# Patient Record
Sex: FEMALE | Race: WHITE | Employment: OTHER | URBAN - METROPOLITAN AREA
[De-identification: names, ages, dates, MRNs, and addresses within clinical notes are randomized per-mention and may not be internally consistent; named-entity substitution may affect disease eponyms.]

---

## 2017-09-06 ENCOUNTER — THERAPY VISIT (OUTPATIENT)
Dept: PHYSICAL THERAPY | Facility: CLINIC | Age: 77
End: 2017-09-06
Payer: MEDICARE

## 2017-09-06 DIAGNOSIS — M72.2 BILATERAL PLANTAR FASCIITIS: Primary | ICD-10-CM

## 2017-09-06 PROCEDURE — G8978 MOBILITY CURRENT STATUS: HCPCS | Mod: GP | Performed by: PHYSICAL THERAPIST

## 2017-09-06 PROCEDURE — G8979 MOBILITY GOAL STATUS: HCPCS | Mod: GP | Performed by: PHYSICAL THERAPIST

## 2017-09-06 PROCEDURE — 97110 THERAPEUTIC EXERCISES: CPT | Mod: GP | Performed by: PHYSICAL THERAPIST

## 2017-09-06 PROCEDURE — 97161 PT EVAL LOW COMPLEX 20 MIN: CPT | Mod: GP | Performed by: PHYSICAL THERAPIST

## 2017-09-06 NOTE — MR AVS SNAPSHOT
"              After Visit Summary   9/6/2017    Yessica Nick    MRN: [id number]           Patient Information     Date Of Birth          1940        Visit Information        Provider Department      9/6/2017 7:40 AM Pamela Mccormick, PT [facility]        Today's Diagnoses     Bilateral plantar fasciitis    -  1       Follow-ups after your visit        Your next 10 appointments already scheduled     Sep 13, 2017  1:30 PM CDT   KAY Extremity with Pamela Mccormick PT   [facility] [place] [facility] ([facility] [place]  )    [address] [id number] [phone]              Who to contact     If you have questions or need follow up information about today's clinic visit or your schedule please contact [facility] PHYSICAL THERAPY directly at [phone].  Normal or non-critical lab and imaging results will be communicated to you by MyChart, letter or phone within 4 business days after the clinic has received the results. If you do not hear from us within 7 days, please contact the clinic through [employer] or phone. If you have a critical or abnormal lab result, we will notify you by phone as soon as possible.  Submit refill requests through [employer] or call your pharmacy and they will forward the refill request to us. Please allow 3 business days for your refill to be completed.          Additional Information About Your Visit        MyChart Information     [employer] lets you send messages to your doctor, view your test results, renew your prescriptions, schedule appointments and more. To sign up, go to [email or web address] . Click on \"Log in\" on the left side of the screen, which will take you to the Welcome page. Then click on \"Sign up Now\" on the right side of the page.     You will be asked to enter the access code listed below, as well as some personal " information. Please follow the directions to create your username and password.     Your access code is: [email or web address]  Expires: 2017  1:11 PM     Your access code will  in 90 days. If you need help or a new code, please call your [place] clinic or [phone].        Care EveryWhere ID     This is your Care EveryWhere ID. This could be used by other organizations to access your [place] medical records  [id number]         Blood Pressure from Last 3 Encounters:   No data found for BP    Weight from Last 3 Encounters:   No data found for Wt              We Performed the Following     HC PT EVAL, LOW COMPLEXITY     KAY CERT REPORT     KAY INITIAL EVAL REPORT     THERAPEUTIC EXERCISES        Primary Care Provider    None Specified       No primary provider on file.        Equal Access to Services     MARIE ORNELAS : Hadii merry Li, waaxda kristaadaha, qaybta kaalmada adecirayanoel, katty zelaya . So [facility] [phone].    ATENCIÓN: Si habla español, tiene a rodríguez disposición servicios gratuitos de asistencia lingüística. Llame al [phone].    We comply with applicable federal civil rights laws and [place] laws. We do not discriminate on the basis of race, color, national origin, age, disability sex, sexual orientation or gender identity.            Thank you!     Thank you for choosing [facility] PHYSICAL THERAPY  for your care. Our goal is always to provide you with excellent care. Hearing back from our patients is one way we can continue to improve our services. Please take a few minutes to complete the written survey that you may receive in the mail after your visit with us. Thank you!             Your Updated Medication List - Protect others around you: Learn how to safely use, store and throw away your medicines at www.disposemymeds.org.      Notice  As of 2017 11:59 PM    You have not been prescribed any medications.

## 2017-09-06 NOTE — PROGRESS NOTES
"[place] [facility] Initial Evaluation    Subjective:    Patient is a 76 year old female presenting with rehab left ankle/foot hpi.   Yessica Nick is a 76 year old female with a bilateral feet condition.        Patient had insidious onset pain bilateral plantar heel and arch, left>right about 2 weeks ago, MD orders for PT 8-22-17.  Pain is ranging 0-6/10, describes as \"achy/sharp\".  Symptoms increase with first steps in the morning or after sitting, walking >10'.  Symptoms decrease with stretching her great toe into extension and using ice.  Patient had similar issue on right side in 2012, resolved with PT.  Patient volunteers 3 hours (1 day/week) - on her feet. .             Pain is the same all the time.     Since onset symptoms are gradually worsening.        General health as reported by patient is good.  Pertinent medical history includes:  High blood pressure and anemia.  Medical allergies: no.  Other surgeries include:  None reported.  Current medications:  High blood pressure medication.  Current occupation is Retired - office.        Barriers include:  None as reported by patient.    Red flags:  None as reported by patient.                        Objective:    Standing Alignment:                Ankle/foot deviations: arch height normal in WB, high NWB; no hallux valgus, no bunions.    Gait:  Shortened step length, early toe-off bilaterally     Gait Type:  Antalgic   Assistive Devices:  None            Ankle/Foot Evaluation  ROM:    AROM:    Dorsiflexion:  Left:   0  Right:   1  Plantarflexion:  Left:  60    Right:  54  Inversion:  Left:  WNL     Right:  WNL  Eversion:  WNL     Right:  WNL      PROM:              Great Toe Extension:  Left:    WNL     Right: WNL        LIGAMENT TESTING: normal                PALPATION:   Left ankle tenderness present at:  plantar fascia and medial calcaneal  Left ankle tenderness not present at:   achilles tendon; anterior tibialis; posterior tibialis or " anterior talofibular ligament  Right ankle tenderness present at:   plantar fascia and medial calcaneal  Right ankle tenderness not present at:  achilles tendon; anterior tibialis; posterior tibialis or anterior talofibular ligament  EDEMA: normal                                                            MMT bilateral hips, knees, ankles WNL  General     ROS    Assessment/Plan:      Patient is a 76 year old female with both sides ankle complaints.    Patient has the following significant findings with corresponding treatment plan.                Diagnosis 1:  Bilateral plantar fascitis    Pain -  hot/cold therapy, self management, education and home program  Decreased ROM/flexibility - manual therapy, therapeutic exercise and home program  Inflammation - cold therapy and self management/home program  Impaired gait - home program  Decreased function - therapeutic activities and home program    Therapy Evaluation Codes:   1) History comprised of:   Personal factors that impact the plan of care:      Past/current experiences.    Comorbidity factors that impact the plan of care are:      None.     Medications impacting care: None.  2) Examination of Body Systems comprised of:   Body structures and functions that impact the plan of care:      Ankle.   Activity limitations that impact the plan of care are:      Walking.  3) Clinical presentation characteristics are:   Stable/Uncomplicated.  4) Decision-Making    Low complexity using standardized patient assessment instrument and/or measureable assessment of functional outcome.  Cumulative Therapy Evaluation is: Low complexity.    Previous and current functional limitations:  (See Goal Flow Sheet for this information)    Short term and Long term goals: (See Goal Flow Sheet for this information)     Communication ability:  Patient appears to be able to clearly communicate and understand verbal and written communication and follow directions correctly.  Treatment  Explanation - The following has been discussed with the patient:   RX ordered/plan of care  Anticipated outcomes  Possible risks and side effects  This patient would benefit from PT intervention to resume normal activities.   Rehab potential is good.    Frequency:  1 X week, once daily  Duration:  for 6 weeks  Discharge Plan:  Achieve all LTG.  Independent in home treatment program.  Reach maximal therapeutic benefit.    Please refer to the daily flowsheet for treatment today, total treatment time and time spent performing 1:1 timed codes.

## 2017-09-06 NOTE — LETTER
"DEPARTMENT OF HEALTH AND HUMAN SERVICES  CENTERS FOR MEDICARE & MEDICAID SERVICES    PLAN/UPDATED PLAN OF PROGRESS FOR OUTPATIENT REHABILITATION    PATIENTS NAME:  Yessica Nick   : 1940  PROVIDER NUMBER:    [id number]  [id number]  PROVIDER NAME: [place] [facility] PH[facility]ICAL [facility]  MEDICAL RECORD NUMBER: [id number]   START OF CARE DATE:  SOC Date: 17   TYPE:  PT  PRIMARY/TREATMENT DIAGNOSIS: (Pertinent Medical Diagnosis)  Bilateral plantar fasciitis    VISITS FROM START OF CARE:  Rxs Used: 1     [place] [facility] Initial Evaluation    Subjective:  Yessica Nick is a 76 year old female with a bilateral feet condition.  Patient had insidious onset pain bilateral plantar heel and arch, left>right about 2 weeks ago.  Pain is ranging 0-6/10, describes as \"achy/sharp\".  Symptoms increase with first steps in the morning or after sitting, walking >10'.  Symptoms decrease with stretching her great toe into extension and using ice.  Patient had similar issue on right side in , resolved with PT.  Patient volunteers 3 hours (1 day/week) - on her feet.  Pain is the same all the time.  Since onset symptoms are gradually worsening.   General health as reported by patient is good.  Pertinent medical history includes:  High blood pressure and anemia.  Medical allergies: no.  Other surgeries include:  None reported.  Current medications:  High blood pressure medication.  Current occupation is Retired - office.   Barriers include:  None as reported by patient.  Red flags:  None as reported by patient.    Objective:  Standing Alignment:    Ankle/foot deviations: arch height normal in WB, high NWB; no hallux valgus, no bunions.  Gait:  Shortened step length, early toe-off bilaterally   Gait Type:  Antalgic   Assistive Devices:  None  Ankle/Foot Evaluation  ROM:    AROM:    Dorsiflexion:  Left:   0  Right:   1  Plantarflexion:  Left:  60    Right:  54  Inversion:  " Left:  WNL     Right:  WNL  Eversion:  WNL     Right:  WNL  PROM:    Great Toe Extension:  Left:    WNL     Right: WNL  LIGAMENT TESTING: normal  PALPATION:   Left ankle tenderness present at:  plantar fascia and medial calcaneal  Left ankle tenderness not present at:   achilles tendon; anterior tibialis; posterior tibialis or anterior talofibular ligament  Right ankle tenderness present at:   plantar fascia and medial calcaneal  Right ankle tenderness not present at:  achilles tendon; anterior tibialis; posterior tibialis or anterior talofibular ligament  EDEMA: normal  MMT bilateral hips, knees, ankles WNL    ROS  Assessment/Plan:    Patient is a 76 year old female with both sides ankle complaints.    Patient has the following significant findings with corresponding treatment plan.                Diagnosis 1:  Bilateral plantar fascitis  Pain -  hot/cold therapy, self management, education and home program  Decreased ROM/flexibility - manual therapy, therapeutic exercise and home program  Inflammation - cold therapy and self management/home program  Impaired gait - home program  Decreased function - therapeutic activities and home program    Therapy Evaluation Codes:   1) History comprised of:   Personal factors that impact the plan of care:      Past/current experiences.    Comorbidity factors that impact the plan of care are:      None.     Medications impacting care: None.  2) Examination of Body Systems comprised of:   Body structures and functions that impact the plan of care:      Ankle.   Activity limitations that impact the plan of care are:      Walking.  3) Clinical presentation characteristics are:   Stable/Uncomplicated.  4) Decision-Making    Low complexity using standardized patient assessment instrument and/or   measureable assessment of functional outcome.  Cumulative Therapy Evaluation is: Low complexity.    Previous and current functional limitations:  (See Goal Flow Sheet for this information)   "  Short term and Long term goals: (See Goal Flow Sheet for this information)     Communication ability:  Patient appears to be able to clearly communicate and understand verbal and written communication and follow directions correctly.  Treatment Explanation - The following has been discussed with the patient:   RX ordered/plan of care  Anticipated outcomes  Possible risks and side effects  This patient would benefit from PT intervention to resume normal activities.   Rehab potential is good.    Frequency:  1 X week, once daily  Duration:  for 6 weeks  Discharge Plan:  Achieve all LTG.  Independent in home treatment program.  Reach maximal therapeutic benefit.            Caregiver Signature/Credentials _____________________________ Date ________       Treating Provider: Pamela Mccormick PT    I have reviewed and certified the need for these services and plan of treatment while under my care.        PHYSICIAN'S SIGNATURE:   __________________________________  Date___________         Pamela Goldberg MD    Certification period:  Beginning of Cert date period: 09/06/17 to  End of Cert period date: 11/05/17     Functional Level Progress Report: Please see attached \"Goal Flow sheet for Functional level.\"    ____X____ Continue Services or       ________ DC Services                Service dates: From  SOC Date: 09/06/17 date to present                         "

## 2017-09-06 NOTE — LETTER
"DEPARTMENT OF HEALTH AND HUMAN SERVICES  CENTERS FOR MEDICARE & MEDICAID SERVICES    PLAN/UPDATED PLAN OF PROGRESS FOR OUTPATIENT REHABILITATION    PATIENTS NAME:  Yessica Nick   : 1940  PR[facility]BER:    [id number]  [id number]  PROVIDER NAME: [place] [facility] PHYSICAL THERAPY  MEDICAL RECORD NUMBER: [id number]   START OF CARE DATE:  SOC Date: 17   TYPE:  PT  PRIMARY/TREATMENT DIAGNOSIS: (Pertinent Medical Diagnosis)  Bilateral plantar fasciitis    VISITS FROM START OF CARE:  Rxs Used: 1     [place] [facility] Initial Evaluation    Subjective:  Yessica Nick is a 76 year old female with a bilateral feet condition.   Patient had insidious onset pain bilateral plantar heel and arch, left>right about 2 weeks ago, MD orders for PT 17.  Pain is ranging 0-6/10, describes as \"achy/sharp\".  Symptoms increase with first steps in the morning or after sitting, walking >10'.  Symptoms decrease with stretching her great toe into extension and using ice.  Patient had similar issue on right side in , resolved with PT.  Patient volunteers 3 hours (1 day/week) - on her feet.  Pain is the same all the time.   Since onset symptoms are gradually worsening.  General health as reported by patient is good.  Pertinent medical history includes:  High blood pressure and anemia.  Medical allergies: no.  Other surgeries include:  None reported.  Current medications:  High blood pressure medication.  Current occupation is Retired - office.   Barriers include:  None as reported by patient.  Red flags:  None as reported by patient.    Objective:  Standing Alignment:    Ankle/foot deviations: arch height normal in WB, high NWB; no hallux valgus, no bunions.  Gait:  Shortened step length, early toe-off bilaterally   Gait Type:  Antalgic   Assistive Devices:  None  Ankle/Foot Evaluation  ROM:    AROM:    Dorsiflexion:  Left:   0  Right:   1  Plantarflexion:  Left:  60    " Right:  54  Inversion:  Left:  WNL     Right:  WNL  Eversion:  WNL     Right:  WNL  PROM:    Great Toe Extension:  Left:    WNL     Right: WNL  LIGAMENT TESTING: normal          PALPATION:   Left ankle tenderness present at:  plantar fascia and medial calcaneal  Left ankle tenderness not present at:   achilles tendon; anterior tibialis; posterior tibialis or anterior talofibular ligament  Right ankle tenderness present at:   plantar fascia and medial calcaneal  Right ankle tenderness not present at:  achilles tendon; anterior tibialis; posterior tibialis or anterior talofibular ligament  EDEMA: normal  MMT bilateral hips, knees, ankles WNL    ROS  Assessment/Plan:    Patient is a 76 year old female with both sides ankle complaints.    Patient has the following significant findings with corresponding treatment plan.                Diagnosis 1:  Bilateral plantar fascitis  Pain -  hot/cold therapy, self management, education and home program  Decreased ROM/flexibility - manual therapy, therapeutic exercise and home program  Inflammation - cold therapy and self management/home program  Impaired gait - home program  Decreased function - therapeutic activities and home program    Therapy Evaluation Codes:   1) History comprised of:   Personal factors that impact the plan of care:      Past/current experiences.    Comorbidity factors that impact the plan of care are:      None.     Medications impacting care: None.  2) Examination of Body Systems comprised of:   Body structures and functions that impact the plan of care:      Ankle.   Activity limitations that impact the plan of care are:      Walking.  3) Clinical presentation characteristics are:   Stable/Uncomplicated.  4) Decision-Making    Low complexity using standardized patient assessment instrument and/or   measureable assessment of functional outcome.  Cumulative Therapy Evaluation is: Low complexity.    Previous and current functional limitations:  (See Goal  "Flow Sheet for this information)    Short term and Long term goals: (See Goal Flow Sheet for this information)     Communication ability:  Patient appears to be able to clearly communicate and understand verbal and written communication and follow directions correctly.  Treatment Explanation - The following has been discussed with the patient:   RX ordered/plan of care  Anticipated outcomes  Possible risks and side effects  This patient would benefit from PT intervention to resume normal activities.   Rehab potential is good.      Frequency:  1 X week, once daily  Duration:  for 6 weeks  Discharge Plan:  Achieve all LTG.  Independent in home treatment program.  Reach maximal therapeutic benefit.    Caregiver Signature/Credentials _____________________________ Date ________       Treating Provider: Pamela Mccormick PT    I have reviewed and certified the need for these services and plan of treatment while under my care.        PHYSICIAN'S SIGNATURE:   ________________________________ Date___________       Pamela Goldberg MD    Certification period:  Beginning of Cert date period: 09/06/17 to  End of Cert period date: 11/05/17     Functional Level Progress Report: Please see attached \"Goal Flow sheet for Functional level.\"    ____X____ Continue Services or       ________ DC Services                Service dates: From  SOC Date: 09/06/17 date to present                         "

## 2017-09-13 ENCOUNTER — THERAPY VISIT (OUTPATIENT)
Dept: PHYSICAL THERAPY | Facility: CLINIC | Age: 77
End: 2017-09-13
Payer: MEDICARE

## 2017-09-13 DIAGNOSIS — M72.2 BILATERAL PLANTAR FASCIITIS: ICD-10-CM

## 2017-09-13 PROCEDURE — 97110 THERAPEUTIC EXERCISES: CPT | Mod: GP | Performed by: PHYSICAL THERAPIST

## 2017-09-20 ENCOUNTER — THERAPY VISIT (OUTPATIENT)
Dept: PHYSICAL THERAPY | Facility: CLINIC | Age: 77
End: 2017-09-20
Payer: MEDICARE

## 2017-09-20 DIAGNOSIS — M72.2 BILATERAL PLANTAR FASCIITIS: ICD-10-CM

## 2017-09-20 PROCEDURE — 97110 THERAPEUTIC EXERCISES: CPT | Mod: GP | Performed by: PHYSICAL THERAPIST

## 2017-09-20 PROCEDURE — 97140 MANUAL THERAPY 1/> REGIONS: CPT | Mod: GP | Performed by: PHYSICAL THERAPIST

## 2017-09-20 NOTE — MR AVS SNAPSHOT
"              After Visit Summary   9/20/2017    Yessica Nick    MRN: [id number]           Patient Information     Date Of Birth          1940        Visit Information        Provider Department      9/20/2017 10:20 AM Pamela Mccormick PT [facility] Physical Therapy        Today's Diagnoses     Bilateral plantar fasciitis           Follow-ups after your visit        Your next 10 appointments already scheduled     Sep 29, 2017  9:00 AM CDT   KAY Extremity with Pamela Mccormick PT   [facility]    [address] [id number]   [phone]              Who to contact     If you have questions or need follow up information about today's clinic visit or your schedule please contact [facility] ATHLETIC [facility] PHYSICAL THERAPY directly at [phone].  Normal or non-critical lab and imaging results will be communicated to you by MyChart, letter or phone within 4 business days after the clinic has received the results. If you do not hear from us within 7 days, please contact the clinic through MyChart or phone. If you have a critical or abnormal lab result, we will notify you by phone as soon as possible.  Submit refill requests through [employer] or call your pharmacy and they will forward the refill request to us. Please allow 3 business days for your refill to be completed.          Additional Information About Your Visit        MyChart Information     [employer] lets you send messages to your doctor, view your test results, renew your prescriptions, schedule appointments and more. To sign up, go to [email or web address] . Click on \"Log in\" on the left side of the screen, which will take you to the Welcome page. Then click on \"Sign up Now\" on the right side of the page.     You will be asked to enter the access code listed below, as well as some personal information. " Please follow the directions to create your username and password.     Your access code is: [email or web address]  Expires: 2017  1:11 PM     Your access code will  in 90 days. If you need help or a new code, please call your [place] clinic or [phone].        Care EveryWhere ID     This is your Care EveryWhere ID. This could be used by other organizations to access your [place] medical records  [id number]         Blood Pressure from Last 3 Encounters:   No data found for BP    Weight from Last 3 Encounters:   No data found for Wt              We Performed the Following     MANUAL THER TECH,1+REGIONS,EA 15 MIN     THERAPEUTIC EXERCISES        Primary Care Provider    None Specified       No primary provider on file.        Equal Access to Services     MARIE ORNELAS : Fariha Li, soraya douglass, hernando hall, katty zelaya . So [facility] [phone].    ATENCIÓN: Si habla español, tiene a rodríguez disposición servicios gratuitos de asistencia lingüística. Llame al [phone].    We comply with applicable federal civil rights laws and [place] laws. We do not discriminate on the basis of race, color, national origin, age, disability sex, sexual orientation or gender identity.            Thank you!     Thank you for choosing [facility] PHYSICAL THERAPY  for your care. Our goal is always to provide you with excellent care. Hearing back from our patients is one way we can continue to improve our services. Please take a few minutes to complete the written survey that you may receive in the mail after your visit with us. Thank you!             Your Updated Medication List - Protect others around you: Learn how to safely use, store and throw away your medicines at www.disposemymeds.org.      Notice  As of 2017 10:32 PM    You have not been prescribed any medications.

## 2019-01-15 PROBLEM — M72.2 BILATERAL PLANTAR FASCIITIS: Status: RESOLVED | Noted: 2017-09-06 | Resolved: 2019-01-15

## 2019-01-15 NOTE — PROGRESS NOTES
"Subjective:  HPI                    Objective:  System    Physical Exam    General     ROS    Assessment/Plan:    DISCHARGE REPORT    Progress reporting period is from 9-6-17 to 9-20-17, 3 visits.       SUBJECTIVE  Patient initially had c/o bilateral plantar heel and arch, left>right ranged 0-6/10, described as \"achy/sharp\".  Symptoms increased with first steps in the morning or after sitting, walking >10'.  Symptoms decreased with stretching her great toe into extension and using ice.    At last visit patient reported pain was improving left, symptoms nil right. Pain with first steps in a.m. decreased.    Final status unknown as patient did not return for further visits.   Pain level at last visit: 0/10(at rest ).     Initial Pain level: (0-6/10).   Changes in function:  Yes (See Goal flowsheet attached for changes in current functional level)  Adverse reaction to treatment or activity: None    OBJECTIVE  AROM right ankle DF 4 degrees, left 3 degrees past neutral, slight antalgic gait.       ASSESSMENT/PLAN  Updated problem list and treatment plan: Diagnosis 1:  Bilateral plantar fascitis    Assessment of Progress: The patient has not returned to therapy. Current status is unknown.  Self Management Plans:  Patient has been instructed in a home treatment program.      Recommendations:  Discontinue PT as did not return for further visits.    Please refer to the daily flowsheet for treatment today, total treatment time and time spent performing 1:1 timed codes.            "

## 2019-07-15 ENCOUNTER — THERAPY VISIT (OUTPATIENT)
Dept: PHYSICAL THERAPY | Facility: CLINIC | Age: 79
End: 2019-07-15
Payer: MEDICARE

## 2019-07-15 DIAGNOSIS — M79.18 RIGHT BUTTOCK PAIN: ICD-10-CM

## 2019-07-15 PROCEDURE — 97110 THERAPEUTIC EXERCISES: CPT | Mod: GP | Performed by: PHYSICAL THERAPIST

## 2019-07-15 PROCEDURE — 97161 PT EVAL LOW COMPLEX 20 MIN: CPT | Mod: GP | Performed by: PHYSICAL THERAPIST

## 2019-07-15 NOTE — PROGRESS NOTES
[facility] [facility] Initial Evaluation  Subjective:      and reported as 2/10 on pain scale. General health as reported by patient is good. Pertinent medical history includes:  Anemia, concussions/dizziness, high blood pressure and osteoporosis.      Current medications:  Bone density and high blood pressure medication.   Primary job tasks include:  Driving, lifting/carrying and pushing/pulling.           Patient is Retired.         Oswestry Score: 22.22 %                 Objective:  System    Physical Exam    General     ROS    Assessment/Plan:

## 2019-07-15 NOTE — LETTER
DEPARTMENT OF HEALTH AND HUMAN SERVICES  CENTERS FOR MEDICARE & MEDICAID SERVICES    PLAN/UPDATED PLAN OF PROGRESS FOR OUTPATIENT REHABILITATION    PATIENTS NAME:  Yessica Nick   : 1940  PROVIDER NUMBER:    [id number]  HICN:  [id number]   PROVIDER NAME: [place] [facility]  MEDICAL RECORD NUMBER: [id number]   START OF CARE DATE:  SOC Date: 07/15/19   TYPE:  PT  PRIMARY/TREATMENT DIAGNOSIS: (Pertinent Medical Diagnosis)  Right buttock pain    VISITS FROM START OF CARE:  Rxs Used: 1     [place] [facility] Initial Evaluation  Subjective:  Condition occurred with:  Insidious onset. This is a recurrent condition   Problem details: Pt presents with complaints of right posterior buttock pain with referred sx's into lateral right thigh. Pt reported onset of sx's approximately 3 months ago for no apparent reason. Pt reported similar sx's in the past which would resolve.  Pt reports sx's are intermittent throughout the day and not aggravated with any particular position or activity. MD office visit 19; PT referral generated.   Site of Pain: denies low back pain. Radiates to:  Gluteals right, thigh right and lower leg right (occasional sx's below the knee).  Symptoms are exacerbated by walking and bending and reported as 2/10 on pain scale. General health as reported by patient is good. Pertinent medical history includes:  Anemia, concussions/dizziness, high blood pressure and osteoporosis.      Current medications:  Bone density and high blood pressure medication.   Primary job tasks include:  Driving, lifting/carrying and pushing/pulling.  Patient is Retired. Oswestry Score: 22.22 %                        Objective:  Standing Alignment:    Cervical/Thoracic:  Thoracic kyphosis increased  Lumbar/SI Evaluation  ROM:    AROM Lumbar:   Flexion:        Fingertips to mid lower leg, reports of mild increase of right buttock sx's  Ext:                    Mild  loss in standing, reports of mild increase right buttock sx's in standing; mild loss in lying, reports of increased low back/right buttock sx's initially with resolution of sx's with repetition   Side Bend:        Left:     Right:   Rotation:           Left:     Right:   Side Glide:        Left:     Right:   Hip Evaluation  Hip PROM:  Hip PROM:  Left Hip:    Normal  Right Hip:  Normal (reports of increased right buttock sx's with passive ER on the R)  Hip Palpation:    Right hip tenderness present at:  Piriformis       ROS    Assessment/Plan:    Patient is a 78 year old female with lumbar complaints.    Patient has the following significant findings with corresponding treatment plan.                Diagnosis 1:  Right posterior buttock pain  Pain -  manual therapy, self management, education and home program  Decreased ROM/flexibility - manual therapy and therapeutic exercise  Decreased function - therapeutic activities    Therapy Evaluation Codes:   1) History comprised of:   Personal factors that impact the plan of care:      None.    Comorbidity factors that impact the plan of care are:      None.     Medications impacting care: None.  2) Examination of Body Systems comprised of:   Body structures and functions that impact the plan of care:      Hip and Lumbar spine.   Activity limitations that impact the plan of care are:      Bending and Walking.  3) Clinical presentation characteristics are:   Stable/Uncomplicated.  4) Decision-Making    Low complexity using standardized patient assessment instrument and/or   measureable assessment of functional outcome.  Cumulative Therapy Evaluation is: Low complexity.    Previous and current functional limitations:  (See Goal Flow Sheet for this information)    Short term and Long term goals: (See Goal Flow Sheet for this information)     Communication ability:  Patient appears to be able to clearly communicate and understand verbal and written communication and follow  "directions correctly.  Treatment Explanation - The following has been discussed with the patient:   RX ordered/plan of care  Anticipated outcomes  Possible risks and side effects  This patient would benefit from PT intervention to resume normal activities.   Rehab potential is good.    Frequency:  1 X week, once daily  Duration:  for 6 weeks  Discharge Plan:  Achieve all LTG.  Independent in home treatment program.  Reach maximal therapeutic benefit.                         Caregiver Signature/Credentials _____________________________ Date ________       Treating Provider: Shabana Tang, PT   I have reviewed and certified the need for these services and plan of treatment while under my care.        PHYSICIAN'S SIGNATURE:   ____________________________ Date___________                                Pamela Goldberg MD    Certification period:  Beginning of Cert date period: 07/15/19 to  End of Cert period date: 09/12/19     Functional Level Progress Report: Please see attached \"Goal Flow sheet for Functional level.\"    ____X____ Continue Services or       ________ DC Services                Service dates: From  SOC Date: 07/15/19 date to present                         "

## 2019-07-15 NOTE — PROGRESS NOTES
[place] [facility] Initial Evaluation  Subjective:    Type of problem:  Lumbar   Condition occurred with:  Insidious onset. This is a recurrent condition   Problem details: Pt presents with complaints of right posterior buttock pain with referred sx's into lateral right thigh. Pt reported onset of sx's approximately 3 months ago for no apparent reason. Pt reported similar sx's in the past which would resolve.  Pt reports sx's are intermittent throughout the day and not aggravated with any particular position or activity. MD office visit 6-28-19; PT referral generated.   Site of Pain: denies low back pain. Radiates to:  Gluteals right, thigh right and lower leg right (occasional sx's below the knee).  Symptoms are exacerbated by walking and bending                       Objective:  Standing Alignment:    Cervical/Thoracic:  Thoracic kyphosis increased                               Lumbar/SI Evaluation  ROM:    AROM Lumbar:   Flexion:        Fingertips to mid lower leg, reports of mild increase of right buttock sx's  Ext:                    Mild loss in standing, reports of mild increase right buttock sx's in standing; mild loss in lying, reports of increased low back/right buttock sx's initially with resolution of sx's with repetition   Side Bend:        Left:     Right:   Rotation:           Left:     Right:   Side Glide:        Left:     Right:                                                               Hip Evaluation  Hip PROM:  Hip PROM:  Left Hip:    Normal  Right Hip:  Normal (reports of increased right buttock sx's with passive ER on the R)                              Hip Palpation:      Right hip tenderness present at:  Piriformis             General     ROS    Assessment/Plan:    Patient is a 78 year old female with lumbar complaints.    Patient has the following significant findings with corresponding treatment plan.                Diagnosis 1:  Right posterior buttock pain  Pain -  manual  therapy, self management, education and home program  Decreased ROM/flexibility - manual therapy and therapeutic exercise  Decreased function - therapeutic activities    Therapy Evaluation Codes:   1) History comprised of:   Personal factors that impact the plan of care:      None.    Comorbidity factors that impact the plan of care are:      None.     Medications impacting care: None.  2) Examination of Body Systems comprised of:   Body structures and functions that impact the plan of care:      Hip and Lumbar spine.   Activity limitations that impact the plan of care are:      Bending and Walking.  3) Clinical presentation characteristics are:   Stable/Uncomplicated.  4) Decision-Making    Low complexity using standardized patient assessment instrument and/or measureable assessment of functional outcome.  Cumulative Therapy Evaluation is: Low complexity.    Previous and current functional limitations:  (See Goal Flow Sheet for this information)    Short term and Long term goals: (See Goal Flow Sheet for this information)     Communication ability:  Patient appears to be able to clearly communicate and understand verbal and written communication and follow directions correctly.  Treatment Explanation - The following has been discussed with the patient:   RX ordered/plan of care  Anticipated outcomes  Possible risks and side effects  This patient would benefit from PT intervention to resume normal activities.   Rehab potential is good.    Frequency:  1 X week, once daily  Duration:  for 6 weeks  Discharge Plan:  Achieve all LTG.  Independent in home treatment program.  Reach maximal therapeutic benefit.    Please refer to the daily flowsheet for treatment today, total treatment time and time spent performing 1:1 timed codes.

## 2019-07-22 ENCOUNTER — THERAPY VISIT (OUTPATIENT)
Dept: PHYSICAL THERAPY | Facility: CLINIC | Age: 79
End: 2019-07-22
Payer: MEDICARE

## 2019-07-22 DIAGNOSIS — M79.18 RIGHT BUTTOCK PAIN: Primary | ICD-10-CM

## 2019-07-22 PROCEDURE — 97530 THERAPEUTIC ACTIVITIES: CPT | Mod: GP | Performed by: PHYSICAL THERAPIST

## 2019-07-22 PROCEDURE — 97110 THERAPEUTIC EXERCISES: CPT | Mod: GP | Performed by: PHYSICAL THERAPIST

## 2019-07-30 ENCOUNTER — THERAPY VISIT (OUTPATIENT)
Dept: PHYSICAL THERAPY | Facility: CLINIC | Age: 79
End: 2019-07-30
Payer: MEDICARE

## 2019-07-30 DIAGNOSIS — M79.18 RIGHT BUTTOCK PAIN: ICD-10-CM

## 2019-07-30 PROCEDURE — 97530 THERAPEUTIC ACTIVITIES: CPT | Mod: GP | Performed by: PHYSICAL THERAPIST

## 2019-07-30 PROCEDURE — 97110 THERAPEUTIC EXERCISES: CPT | Mod: GP | Performed by: PHYSICAL THERAPIST

## 2019-07-30 PROCEDURE — 97112 NEUROMUSCULAR REEDUCATION: CPT | Mod: GP | Performed by: PHYSICAL THERAPIST

## 2019-10-29 PROBLEM — M79.18 RIGHT BUTTOCK PAIN: Status: RESOLVED | Noted: 2019-07-15 | Resolved: 2019-10-29

## 2019-10-29 NOTE — PROGRESS NOTES
DISCHARGE REPORT    Progress reporting period is from 7-15-19 to 7-30-19.       SUBJECTIVE  Subjective changes noted by patient: Pt reported decreased pain since last visit. Walked 1.5 miles this past week without increased sx's.    Current pain level is 1/10.  Previous pain level was  2/10.   Changes in function:  Yes (See Goal flowsheet attached for changes in current functional level)  Adverse reaction to treatment or activity: None    OBJECTIVE  Objective: Restricted ROM R hip with MATEO's position. Limited ROM with active hip ER on the R. Improved ability to perform active hip abduction on the R in sidelying.     ASSESSMENT/PLAN  Updated problem list and treatment plan: Diagnosis 1:  Right posterior buttock pain   STG/LTGs have been met or progress has been made towards goals:  Yes (See Goal flow sheet completed today.)  Assessment of Progress: The patient's condition is improving.  Self Management Plans:  Patient has been instructed in a home treatment program.  Yessica continues to require the following intervention to meet STG and LTG's:  No further PT scheduled following last visit.    Recommendations:  Discharge.    Please refer to the daily flowsheet for treatment today, total treatment time and time spent performing 1:1 timed codes.